# Patient Record
(demographics unavailable — no encounter records)

---

## 2025-05-22 NOTE — DATA REVIEWED
[de-identified] : X-rays of his right foot and ankle taken at an outside facility on May 9 were evaluated by me.  New x-rays taken today including 3 views were also evaluated by me.  They show a nondisplaced fracture of the base of the fifth metatarsal.

## 2025-05-22 NOTE — DEVELOPMENTAL MILESTONES
[Normal] : Developmental history within normal limits [Roll Over: ___ Months] : Roll Over: [unfilled] months [Sit Up: ___ Months] : Sit Up: [unfilled] months [Pull Self to Stand ___ Months] : Pull self to stand: [unfilled] months [Walk ___ Months] : Walk: [unfilled] months [Verbally] : verbally [Left] : left [FreeTextEntry2] : No [FreeTextEntry3] : No

## 2025-05-22 NOTE — CONSULT LETTER
[Dear  ___] : Dear  [unfilled], [Consult Letter:] : I had the pleasure of evaluating your patient, [unfilled]. [Please see my note below.] : Please see my note below. [Consult Closing:] : Thank you very much for allowing me to participate in the care of this patient.  If you have any questions, please do not hesitate to contact me. [Sincerely,] : Sincerely, [FreeTextEntry3] : Pola Harden MD Pediatric Orthopaedics [address] [address] Phone: [phone] Fax: [phone]

## 2025-05-22 NOTE — REASON FOR VISIT
[Initial Evaluation] : an initial evaluation [Patient] : patient [FreeTextEntry1] : Foot injury [Father] : father

## 2025-05-22 NOTE — PHYSICAL EXAM
[FreeTextEntry1] : Alert, comfortable, well-developed, in no apparent distress, well-oriented x3, 15-year-old young man.  He is able to walk independently without crutches by using a Darco shoe on the right.  There is swelling and tenderness on the base of the fifth metatarsal tarsal on the right foot.  Exam of the rest of his right foot and ankle is unremarkable.  Skin is intact.  Neurovascularly grossly intact.

## 2025-05-22 NOTE — ASSESSMENT
[FreeTextEntry1] : Diagnosis: Well-healing nondisplaced fracture base fifth metatarsal right foot.  The history was obtained today from the child and parent; given the patient's age and/or the child's mental capacity, the history was unreliable and the parent was used as an independent historian.  Healthy 15-1/2-year-old young man 2 weeks out of the above injury.  Father and patient are informed about the nature of the diagnosis.  He is to continue with his Darco shoe as needed within the next 2 weeks.  No gym or sports for 3 weeks.  Follow-up on a as needed basis.  All of the father's questions were addressed. He understood and agreed with the plan.  This note was generated using Dragon medical dictation software.  A reasonable effort has been made for proofreading its contents, but typos may still remain.  If there are any questions or points of clarification needed please do not hesitate to contact my office.  The time spent with the patient includes my evaluation of imaging studies and/or tests performed by previous doctors or brought by the family.

## 2025-05-22 NOTE — HISTORY OF PRESENT ILLNESS
[FreeTextEntry1] : Jake is a healthy and active 15-1/2-year-old young man who is here today with his father after being sent by his pediatrician for an orthopedic evaluation of a right ankle/foot injury sustained on May 9 when he was at home going downstairs and tripped twisting it.  He was seen at a local facility where x-rays were taken.  Initially, they were told that there were no fractures but father states, that they received a phone call on the next day saying that he may have a nondisplaced fracture.  He was given a Darco shoe which she has been wearing outdoors.  Father states that he has been walking at home without the Darco shoe at times.